# Patient Record
Sex: FEMALE | Race: BLACK OR AFRICAN AMERICAN | NOT HISPANIC OR LATINO | URBAN - METROPOLITAN AREA
[De-identification: names, ages, dates, MRNs, and addresses within clinical notes are randomized per-mention and may not be internally consistent; named-entity substitution may affect disease eponyms.]

---

## 2020-02-24 VITALS
DIASTOLIC BLOOD PRESSURE: 80 MMHG | TEMPERATURE: 98 F | SYSTOLIC BLOOD PRESSURE: 187 MMHG | RESPIRATION RATE: 16 BRPM | HEART RATE: 67 BPM | OXYGEN SATURATION: 94 %

## 2020-02-24 RX ORDER — CHLORHEXIDINE GLUCONATE 213 G/1000ML
1 SOLUTION TOPICAL ONCE
Refills: 0 | Status: DISCONTINUED | OUTPATIENT
Start: 2020-02-26 | End: 2020-02-27

## 2020-02-24 NOTE — H&P ADULT - NEUROLOGICAL DETAILS
responds to verbal commands/alert and oriented x 3/responds to pain/R UE strength 5/5; LUE strength 4/5;  RLE strength 5/5;  LLE strength on extension 5/5; LLE strength 1/5 on flexion

## 2020-02-24 NOTE — H&P ADULT - ASSESSMENT
H/H . Pt denies bleeding, GI bleeding, hematemesis, hematuria, BRBPR or melena . ASA … and Plavix … pre-cath.  Cr. IV NS@ cc/hr pre-cath.	  Risks & benefits of procedure and alternative therapy have been explained to the patient including but not limited to: allergic reaction, bleeding w/possible need for blood transfusion, infection, renal and vascular compromise, limb damage, arrhythmia, stroke, vessel dissection/perforation, Myocardial infarction, emergent CABG. Informed consent obtained and in chart 79 y/o F, former smoker, with PMH of HTN, HLD, Type II IDDM, CVA x 1 year ago (residual L arm weakness and now uses wheelchair), cataracts and PVD who recently underwent diagnostic peripheral arterial angiogram 2/19/20 due to [place] IV Chronic limb ischemic where significant stenosis was noted in R SFA, R popliteal arteries as well as L common iliac artery. Pt was referred for PTA of the L common iliac artery and b/l femoro-popiteal and infrapopliteal arteries. Pt currently admits to SOB at rest X 2-3 months; she reports that she has intermittent SOB that  occurs out of nowhere with daily minimal activities.  Pt. denies non-healing ulcers, claudication, color changes, LE edema, orthopnea/PND, N/V/D, syncope, fever, chills, BRBPR, hematuria, hematemesis. In light of patient’s risk factors and known PAD, pt is referred for Iliac catheterization with intervention of L common iliac artery.  H/H . Pt denies bleeding, GI bleeding, hematemesis, hematuria, BRBPR or melena . ASA … and Plavix … pre-cath.  Cr. IV NS@ cc/hr pre-cath.	  Risks & benefits of procedure and alternative therapy have been explained to the patient including but not limited to: allergic reaction, bleeding w/possible need for blood transfusion, infection, renal and vascular compromise, limb damage, arrhythmia, stroke, vessel dissection/perforation, Myocardial infarction, emergent CABG. Informed consent obtained and in chart 79 y/o F, former smoker, with PMH of HTN, HLD, Type II IDDM, CVA x 1 year ago (residual L arm weakness and now uses wheelchair), cataracts and PVD who recently underwent diagnostic peripheral arterial angiogram 2/19/20 due to [place] IV Chronic limb ischemic where significant stenosis was noted in R SFA, R popliteal arteries as well as L common iliac artery. Pt was referred for PTA of the L common iliac artery and b/l femoro-popiteal and infrapopliteal arteries.   Pt currently admits to SOB at rest X 2-3 months; she reports that she has intermittent SOB that  occurs out of nowhere with daily minimal activities.  EKG with TWI v4-V6; will discuss cardiac cath with Dr. Villanueva  H/H 14.4/42.6. Pt denies bleeding, GI bleeding, hematemesis, hematuria, BRBPR or melena . Pt. loaded with  mg PO X 1 and Plavix 600 mg PO X 1 pre-cath.  Cr. 0.60;  IV 1/2 NS@ 30 cc/hr pre-cath. EF unknown.   BP on arrival 212/86; no neurolgical symptoms; pt. given Hydralazine 10 IV X 1 and home TOprol 100 mg PO X 1 pre-procedure, 	  K hemolyzed; no chest pain currently; no peaked T waves noted in EKG; will send stat CMP in procedure room once access obtained  BG 350s; SSI ordered; -hemoglobin a1c is 11; consider endo consult post procedure.   Risks & benefits of procedure and alternative therapy have been explained to the patient including but not limited to: allergic reaction, bleeding w/possible need for blood transfusion, infection, renal and vascular compromise, limb damage, arrhythmia, stroke, vessel dissection/perforation, Myocardial infarction, emergent CABG. Informed consent obtained and in chart 81 y/o F, former smoker, with PMH of HTN, HLD, Type II IDDM, CVA x 1 year ago (residual L arm weakness and now uses wheelchair), cataracts and PVD who recently underwent diagnostic peripheral arterial angiogram 2/19/20 due to [place] IV Chronic limb ischemic where significant stenosis was noted in R SFA, R popliteal arteries as well as L common iliac artery. Pt was referred for PTA of the L common iliac artery and b/l femoro-popiteal and infrapopliteal arteries.   Pt currently admits to SOB at rest X 2-3 months; she reports that she has intermittent SOB that  occurs out of nowhere with daily minimal activities.  EKG with TWI v4-V6; plan for ECHO and cardiac cath as per Dr. Villanueva; pt. pre-cath consneted.   H/H 14.4/42.6. Pt denies bleeding, GI bleeding, hematemesis, hematuria, BRBPR or melena . Pt. loaded with  mg PO X 1 and Plavix 600 mg PO X 1 pre-cath.  Cr. 0.60;  IV 1/2 NS@ 30 cc/hr pre-cath. EF unknown.   BP on arrival 212/86; no neurolgical symptoms; pt. given Hydralazine 10 IV X 1 and home TOprol 100 mg PO X 1 pre-procedure, 	BP 190s 2:50 pm; pt. ordered for home hydralazine 100 mg PO X 1.   K hemolyzed; no chest pain currently; no peaked T waves noted in EKG; will send stat CMP in procedure room once access obtained  BG 350s; SSI ordered; -hemoglobin a1c is 11; consider endo consult post procedure.   Risks & benefits of procedure and alternative therapy have been explained to the patient including but not limited to: allergic reaction, bleeding w/possible need for blood transfusion, infection, renal and vascular compromise, limb damage, arrhythmia, stroke, vessel dissection/perforation, Myocardial infarction, emergent CABG. Informed consent obtained and in chart 81 y/o F, former smoker, with PMH of HTN, HLD, Type II IDDM, CVA x 1 year ago (residual L arm/leg weakness and now uses wheelchair), cataracts and PVD who recently underwent diagnostic peripheral arterial angiogram 2/19/20 due to [place] IV Chronic limb ischemic where significant stenosis was noted in R SFA, R popliteal arteries as well as L common iliac artery. Pt was referred for PTA of the L common iliac artery and b/l femoro-popiteal and infrapopliteal arteries.   Pt currently admits to SOB at rest X 2-3 months; she reports that she has intermittent SOB that  occurs out of nowhere with daily minimal activities.  EKG with TWI v4-V6; plan for ECHO and cardiac cath as per Dr. Villanueva; pt. pre-cath consneted.   H/H 14.4/42.6. Pt denies bleeding, GI bleeding, hematemesis, hematuria, BRBPR or melena . Pt. loaded with  mg PO X 1 and Plavix 600 mg PO X 1 pre-cath.  Cr. 0.60;  IV 1/2 NS@ 30 cc/hr pre-cath. EF unknown.   BP on arrival 212/86; no neurolgical symptoms; pt. given Hydralazine 10 IV X 1 and home TOprol 100 mg PO X 1 pre-procedure, 	BP 190s 2:50 pm; pt. ordered for home hydralazine 100 mg PO X 1.   K hemolyzed; no chest pain currently; no peaked T waves noted in EKG; will send stat CMP in procedure room once access obtained  BG 350s; SSI ordered; -hemoglobin a1c is 11; consider endo consult post procedure.   Risks & benefits of procedure and alternative therapy have been explained to the patient including but not limited to: allergic reaction, bleeding w/possible need for blood transfusion, infection, renal and vascular compromise, limb damage, arrhythmia, stroke, vessel dissection/perforation, Myocardial infarction, emergent CABG. Informed consent obtained and in chart 81 y/o F, former smoker, with PMH of HTN, HLD, Type II IDDM, CVA x 1 year ago (residual L arm/leg weakness and now uses wheelchair), cataracts and PVD who recently underwent diagnostic peripheral arterial angiogram 2/19/20 due to [place] IV Chronic limb ischemic where significant stenosis was noted in R SFA, R popliteal arteries as well as L common iliac artery. Pt was referred for PTA of the L common iliac artery and b/l femoro-popiteal and infrapopliteal arteries.   Pt currently admits to SOB at rest X 2-3 months; she reports that she has intermittent SOB that  occurs out of nowhere with daily minimal activities.  EKG with TWI v4-V6; denies any recent ECHO/stress test; plan for ECHO and cardiac cath as per Dr. Villanueva; pt. pre-cath consneted.   H/H 14.4/42.6. Pt denies bleeding, GI bleeding, hematemesis, hematuria, BRBPR or melena . Pt. loaded with  mg PO X 1 and Plavix 600 mg PO X 1 pre-cath.  Cr. 0.60;  IV 1/2 NS@ 30 cc/hr pre-cath. EF unknown.   BP on arrival 212/86; no neurolgical symptoms; pt. given Hydralazine 10 IV X 1 and home TOprol 100 mg PO X 1 pre-procedure, 	BP 190s 2:50 pm; pt. ordered for home hydralazine 100 mg PO X 1.   K hemolyzed; no chest pain currently; no peaked T waves noted in EKG; will send stat CMP in procedure room once access obtained  BG 350s; SSI ordered; -hemoglobin a1c is 11; consider endo consult post procedure.   Risks & benefits of procedure and alternative therapy have been explained to the patient including but not limited to: allergic reaction, bleeding w/possible need for blood transfusion, infection, renal and vascular compromise, limb damage, arrhythmia, stroke, vessel dissection/perforation, Myocardial infarction, emergent CABG. Informed consent obtained and in chart

## 2020-02-24 NOTE — H&P ADULT - NSICDXPASTMEDICALHX_GEN_ALL_CORE_FT
PAST MEDICAL HISTORY:  Cataract     Cerebrovascular accident (CVA) x1 yr ago (wheelbair bound and L arm weakness)    DM (diabetes mellitus)     HLD (hyperlipidemia)     HTN (hypertension)     PAD (peripheral artery disease)

## 2020-02-24 NOTE — H&P ADULT - HISTORY OF PRESENT ILLNESS
53 y/o F, former smoker, with PMH of HTN, HLD, Type II IDDM, CVA x 1 year ago (residual L arm weakness and now uses wheelchair), cataracts and PVD who recently underwent diagnostic peripheral arterial angiogram 2/19/20 due to [place] IV Chronic limb ischemic where significant stenosis was noted in R SFA, R popliteal arteries as well as L common iliac artery. Pt was referred for PTA of the L common iliac artery and b/l femoro-popiteal and infrapopliteal arteries. Pt currently admits to SOB at rest however denies non-healing ulcers, claudication, color changes, LE edema, orthopnea/PND, N/V/D, syncope, fever, chills, BRBPR, hematuria, hematemesis.  In light of patient’s risk factors and known PAD, pt is referred for Iliac catheterization with intervention of L common iliac artery.  Diagnostic Peripherial Catheterization 2/19/20: R common iliac minimal with atherosclerotic disease (<10% stenosis), R external iliac 10%, R common femoral 30%, R SFA 70-80% m/d, R popliteal 70-80% focal stenosis, R anterior tibial minimal atherosclerotic disease. L common iliac 80%, L external iliac 10%, L SFA 70% , L popliteal 90%, L tibial peroneal trunk 99%, L anterior tibial 99%, L peroneal minimal with <10% stenosis, L posterior tibial 100%  distal vessel fills via a robust collateral network from the peroneal. 79 y/o F, former smoker, with PMH of HTN, HLD, Type II IDDM, CVA x 1 year ago (residual L arm weakness and now uses wheelchair), cataracts and PVD who recently underwent diagnostic peripheral arterial angiogram 2/19/20 due to [place] IV Chronic limb ischemic where significant stenosis was noted in R SFA, R popliteal arteries as well as L common iliac artery. Pt was referred for PTA of the L common iliac artery and b/l femoro-popiteal and infrapopliteal arteries. Pt currently admits to SOB at rest X 2-3 months; she reports that she has intermittent SOB that  occurs out of nowhere with daily minimal activities.  Pt. denies non-healing ulcers, claudication, color changes, LE edema, orthopnea/PND, N/V/D, syncope, fever, chills, BRBPR, hematuria, hematemesis. In light of patient’s risk factors and known PAD, pt is referred for Iliac catheterization with intervention of L common iliac artery.  Diagnostic Peripherial Catheterization 2/19/20: R common iliac minimal with atherosclerotic disease (<10% stenosis), R external iliac 10%, R common femoral 30%, R SFA 70-80% m/d, R popliteal 70-80% focal stenosis, R anterior tibial minimal atherosclerotic disease. L common iliac 80%, L external iliac 10%, L SFA 70% , L popliteal 90%, L tibial peroneal trunk 99%, L anterior tibial 99%, L peroneal minimal with <10% stenosis, L posterior tibial 100%  distal vessel fills via a robust collateral network from the peroneal. 81 y/o F, former smoker, with PMH of HTN, HLD, Type II IDDM, CVA x 1 year ago (residual L arm/leg weakness and now uses wheelchair), cataracts and PVD who recently underwent diagnostic peripheral arterial angiogram 2/19/20 due to [place] IV Chronic limb ischemic where significant stenosis was noted in R SFA, R popliteal arteries as well as L common iliac artery. Pt was referred for PTA of the L common iliac artery and b/l femoro-popiteal and infrapopliteal arteries. Pt currently admits to SOB at rest X 2-3 months; she reports that she has intermittent SOB that  occurs out of nowhere with daily minimal activities.  Pt. denies non-healing ulcers, claudication, color changes, LE edema, orthopnea/PND, N/V/D, syncope, fever, chills, BRBPR, hematuria, hematemesis. In light of patient’s risk factors and known PAD, pt is referred for Iliac catheterization with intervention of L common iliac artery.  Diagnostic Peripherial Catheterization 2/19/20: R common iliac minimal with atherosclerotic disease (<10% stenosis), R external iliac 10%, R common femoral 30%, R SFA 70-80% m/d, R popliteal 70-80% focal stenosis, R anterior tibial minimal atherosclerotic disease. L common iliac 80%, L external iliac 10%, L SFA 70% , L popliteal 90%, L tibial peroneal trunk 99%, L anterior tibial 99%, L peroneal minimal with <10% stenosis, L posterior tibial 100%  distal vessel fills via a robust collateral network from the peroneal.

## 2020-02-24 NOTE — H&P ADULT - RS GEN PE MLT RESP DETAILS PC
airway patent/breath sounds equal/good air movement/normal/respirations non-labored/no intercostal retractions/no rales/no chest wall tenderness/no rhonchi/clear to auscultation bilaterally

## 2020-02-24 NOTE — H&P ADULT - NSHPLABSRESULTS_GEN_ALL_CORE
14.4   6.59  )-----------( 232      ( 26 Feb 2020 08:37 )             42.6 14.4   6.59  )-----------( 232      ( 26 Feb 2020 08:37 )             42.6    02-26    135  |  96  |  13  ----------------------------<  306<H>  See Note   |  24  |  0.60    Ca    9.1      26 Feb 2020 08:37    TPro  8.5<H>  /  Alb  4.0  /  TBili  0.4  /  DBili  x   /  AST  See Note  /  ALT  See Note  /  AlkPhos  See Note  02-26  PT/INR - ( 26 Feb 2020 08:37 )   PT: 11.2 sec;   INR: 0.98          PTT - ( 26 Feb 2020 08:37 )  PTT:29.1 sec

## 2020-02-26 ENCOUNTER — INPATIENT (INPATIENT)
Facility: HOSPITAL | Age: 80
LOS: 0 days | Discharge: HOME HEALTH | DRG: 253 | End: 2020-02-27
Attending: INTERNAL MEDICINE | Admitting: INTERNAL MEDICINE
Payer: MEDICARE

## 2020-02-26 DIAGNOSIS — Z90.710 ACQUIRED ABSENCE OF BOTH CERVIX AND UTERUS: Chronic | ICD-10-CM

## 2020-02-26 LAB
ALBUMIN SERPL ELPH-MCNC: 4 G/DL — SIGNIFICANT CHANGE UP (ref 3.3–5)
ALP SERPL-CCNC: SIGNIFICANT CHANGE UP U/L (ref 40–120)
ALT FLD-CCNC: SIGNIFICANT CHANGE UP U/L (ref 10–45)
ANION GAP SERPL CALC-SCNC: 15 MMOL/L — SIGNIFICANT CHANGE UP (ref 5–17)
APTT BLD: 29.1 SEC — SIGNIFICANT CHANGE UP (ref 27.5–36.3)
AST SERPL-CCNC: SIGNIFICANT CHANGE UP U/L (ref 10–40)
BASOPHILS # BLD AUTO: 0.02 K/UL — SIGNIFICANT CHANGE UP (ref 0–0.2)
BASOPHILS NFR BLD AUTO: 0.3 % — SIGNIFICANT CHANGE UP (ref 0–2)
BILIRUB SERPL-MCNC: 0.4 MG/DL — SIGNIFICANT CHANGE UP (ref 0.2–1.2)
BUN SERPL-MCNC: 13 MG/DL — SIGNIFICANT CHANGE UP (ref 7–23)
CALCIUM SERPL-MCNC: 9.1 MG/DL — SIGNIFICANT CHANGE UP (ref 8.4–10.5)
CHLORIDE SERPL-SCNC: 96 MMOL/L — SIGNIFICANT CHANGE UP (ref 96–108)
CHOLEST SERPL-MCNC: 173 MG/DL — SIGNIFICANT CHANGE UP (ref 10–199)
CK MB CFR SERPL CALC: 5.4 NG/ML — SIGNIFICANT CHANGE UP (ref 0–6.7)
CK SERPL-CCNC: SIGNIFICANT CHANGE UP U/L (ref 25–170)
CO2 SERPL-SCNC: 24 MMOL/L — SIGNIFICANT CHANGE UP (ref 22–31)
CREAT SERPL-MCNC: 0.6 MG/DL — SIGNIFICANT CHANGE UP (ref 0.5–1.3)
EOSINOPHIL # BLD AUTO: 0.06 K/UL — SIGNIFICANT CHANGE UP (ref 0–0.5)
EOSINOPHIL NFR BLD AUTO: 0.9 % — SIGNIFICANT CHANGE UP (ref 0–6)
GLUCOSE SERPL-MCNC: 306 MG/DL — HIGH (ref 70–99)
HBA1C BLD-MCNC: 11 % — HIGH (ref 4–5.6)
HCT VFR BLD CALC: 42.6 % — SIGNIFICANT CHANGE UP (ref 34.5–45)
HDLC SERPL-MCNC: 32 MG/DL — LOW
HGB BLD-MCNC: 14.4 G/DL — SIGNIFICANT CHANGE UP (ref 11.5–15.5)
IMM GRANULOCYTES NFR BLD AUTO: 0.5 % — SIGNIFICANT CHANGE UP (ref 0–1.5)
INR BLD: 0.98 — SIGNIFICANT CHANGE UP (ref 0.88–1.16)
LIPID PNL WITH DIRECT LDL SERPL: 86 MG/DL — SIGNIFICANT CHANGE UP
LYMPHOCYTES # BLD AUTO: 1.61 K/UL — SIGNIFICANT CHANGE UP (ref 1–3.3)
LYMPHOCYTES # BLD AUTO: 24.4 % — SIGNIFICANT CHANGE UP (ref 13–44)
MCHC RBC-ENTMCNC: 28.9 PG — SIGNIFICANT CHANGE UP (ref 27–34)
MCHC RBC-ENTMCNC: 33.8 GM/DL — SIGNIFICANT CHANGE UP (ref 32–36)
MCV RBC AUTO: 85.4 FL — SIGNIFICANT CHANGE UP (ref 80–100)
MONOCYTES # BLD AUTO: 0.45 K/UL — SIGNIFICANT CHANGE UP (ref 0–0.9)
MONOCYTES NFR BLD AUTO: 6.8 % — SIGNIFICANT CHANGE UP (ref 2–14)
NEUTROPHILS # BLD AUTO: 4.42 K/UL — SIGNIFICANT CHANGE UP (ref 1.8–7.4)
NEUTROPHILS NFR BLD AUTO: 67.1 % — SIGNIFICANT CHANGE UP (ref 43–77)
NRBC # BLD: 0 /100 WBCS — SIGNIFICANT CHANGE UP (ref 0–0)
PLATELET # BLD AUTO: 232 K/UL — SIGNIFICANT CHANGE UP (ref 150–400)
POTASSIUM SERPL-MCNC: SIGNIFICANT CHANGE UP MMOL/L (ref 3.5–5.3)
POTASSIUM SERPL-SCNC: SIGNIFICANT CHANGE UP MMOL/L (ref 3.5–5.3)
PROT SERPL-MCNC: 8.5 G/DL — HIGH (ref 6–8.3)
PROTHROM AB SERPL-ACNC: 11.2 SEC — SIGNIFICANT CHANGE UP (ref 10–12.9)
RBC # BLD: 4.99 M/UL — SIGNIFICANT CHANGE UP (ref 3.8–5.2)
RBC # FLD: 12.8 % — SIGNIFICANT CHANGE UP (ref 10.3–14.5)
SODIUM SERPL-SCNC: 135 MMOL/L — SIGNIFICANT CHANGE UP (ref 135–145)
TOTAL CHOLESTEROL/HDL RATIO MEASUREMENT: 5.4 RATIO — SIGNIFICANT CHANGE UP (ref 3.3–7.1)
TRIGL SERPL-MCNC: 275 MG/DL — HIGH (ref 10–149)
WBC # BLD: 6.59 K/UL — SIGNIFICANT CHANGE UP (ref 3.8–10.5)
WBC # FLD AUTO: 6.59 K/UL — SIGNIFICANT CHANGE UP (ref 3.8–10.5)

## 2020-02-26 PROCEDURE — 75625 CONTRAST EXAM ABDOMINL AORTA: CPT | Mod: 26

## 2020-02-26 PROCEDURE — 93306 TTE W/DOPPLER COMPLETE: CPT | Mod: 26

## 2020-02-26 PROCEDURE — 75716 ARTERY X-RAYS ARMS/LEGS: CPT | Mod: 26

## 2020-02-26 PROCEDURE — 37223: CPT | Mod: LT

## 2020-02-26 PROCEDURE — 93010 ELECTROCARDIOGRAM REPORT: CPT

## 2020-02-26 RX ORDER — HYDRALAZINE HCL 50 MG
100 TABLET ORAL ONCE
Refills: 0 | Status: COMPLETED | OUTPATIENT
Start: 2020-02-26 | End: 2020-02-26

## 2020-02-26 RX ORDER — AMLODIPINE BESYLATE 2.5 MG/1
10 TABLET ORAL ONCE
Refills: 0 | Status: COMPLETED | OUTPATIENT
Start: 2020-02-26 | End: 2020-02-26

## 2020-02-26 RX ORDER — HYDRALAZINE HCL 50 MG
100 TABLET ORAL DAILY
Refills: 0 | Status: DISCONTINUED | OUTPATIENT
Start: 2020-02-26 | End: 2020-02-27

## 2020-02-26 RX ORDER — MORPHINE SULFATE 50 MG/1
2 CAPSULE, EXTENDED RELEASE ORAL ONCE
Refills: 0 | Status: DISCONTINUED | OUTPATIENT
Start: 2020-02-26 | End: 2020-02-26

## 2020-02-26 RX ORDER — SODIUM CHLORIDE 9 MG/ML
500 INJECTION INTRAMUSCULAR; INTRAVENOUS; SUBCUTANEOUS
Refills: 0 | Status: DISCONTINUED | OUTPATIENT
Start: 2020-02-26 | End: 2020-02-27

## 2020-02-26 RX ORDER — DEXTROSE 50 % IN WATER 50 %
25 SYRINGE (ML) INTRAVENOUS ONCE
Refills: 0 | Status: DISCONTINUED | OUTPATIENT
Start: 2020-02-26 | End: 2020-02-27

## 2020-02-26 RX ORDER — ASPIRIN/CALCIUM CARB/MAGNESIUM 324 MG
81 TABLET ORAL DAILY
Refills: 0 | Status: DISCONTINUED | OUTPATIENT
Start: 2020-02-26 | End: 2020-02-27

## 2020-02-26 RX ORDER — INSULIN LISPRO 100/ML
VIAL (ML) SUBCUTANEOUS ONCE
Refills: 0 | Status: COMPLETED | OUTPATIENT
Start: 2020-02-26 | End: 2020-02-26

## 2020-02-26 RX ORDER — FUROSEMIDE 40 MG
1 TABLET ORAL
Qty: 0 | Refills: 0 | DISCHARGE

## 2020-02-26 RX ORDER — SODIUM CHLORIDE 9 MG/ML
500 INJECTION, SOLUTION INTRAVENOUS
Refills: 0 | Status: DISCONTINUED | OUTPATIENT
Start: 2020-02-26 | End: 2020-02-26

## 2020-02-26 RX ORDER — INSULIN LISPRO 100/ML
VIAL (ML) SUBCUTANEOUS
Refills: 0 | Status: DISCONTINUED | OUTPATIENT
Start: 2020-02-26 | End: 2020-02-27

## 2020-02-26 RX ORDER — ASPIRIN/CALCIUM CARB/MAGNESIUM 324 MG
325 TABLET ORAL ONCE
Refills: 0 | Status: COMPLETED | OUTPATIENT
Start: 2020-02-26 | End: 2020-02-26

## 2020-02-26 RX ORDER — LISINOPRIL 2.5 MG/1
1 TABLET ORAL
Qty: 0 | Refills: 0 | DISCHARGE

## 2020-02-26 RX ORDER — FERROUS SULFATE 325(65) MG
325 TABLET ORAL DAILY
Refills: 0 | Status: DISCONTINUED | OUTPATIENT
Start: 2020-02-26 | End: 2020-02-27

## 2020-02-26 RX ORDER — HYDRALAZINE HCL 50 MG
10 TABLET ORAL ONCE
Refills: 0 | Status: COMPLETED | OUTPATIENT
Start: 2020-02-26 | End: 2020-02-26

## 2020-02-26 RX ORDER — PROTAMINE SULFATE 10 MG/ML
10 AMPUL (ML) INTRAVENOUS ONCE
Refills: 0 | Status: COMPLETED | OUTPATIENT
Start: 2020-02-26 | End: 2020-02-26

## 2020-02-26 RX ORDER — AMLODIPINE BESYLATE 2.5 MG/1
10 TABLET ORAL DAILY
Refills: 0 | Status: DISCONTINUED | OUTPATIENT
Start: 2020-02-26 | End: 2020-02-27

## 2020-02-26 RX ORDER — LISINOPRIL 2.5 MG/1
40 TABLET ORAL DAILY
Refills: 0 | Status: DISCONTINUED | OUTPATIENT
Start: 2020-02-27 | End: 2020-02-27

## 2020-02-26 RX ORDER — GLUCAGON INJECTION, SOLUTION 0.5 MG/.1ML
1 INJECTION, SOLUTION SUBCUTANEOUS ONCE
Refills: 0 | Status: DISCONTINUED | OUTPATIENT
Start: 2020-02-26 | End: 2020-02-27

## 2020-02-26 RX ORDER — FERROUS SULFATE 325(65) MG
0 TABLET ORAL
Qty: 0 | Refills: 0 | DISCHARGE

## 2020-02-26 RX ORDER — SODIUM CHLORIDE 9 MG/ML
1000 INJECTION, SOLUTION INTRAVENOUS
Refills: 0 | Status: DISCONTINUED | OUTPATIENT
Start: 2020-02-26 | End: 2020-02-27

## 2020-02-26 RX ORDER — GABAPENTIN 400 MG/1
400 CAPSULE ORAL THREE TIMES A DAY
Refills: 0 | Status: DISCONTINUED | OUTPATIENT
Start: 2020-02-26 | End: 2020-02-27

## 2020-02-26 RX ORDER — HYDRALAZINE HCL 50 MG
0 TABLET ORAL
Qty: 0 | Refills: 0 | DISCHARGE

## 2020-02-26 RX ORDER — DEXTROSE 50 % IN WATER 50 %
12.5 SYRINGE (ML) INTRAVENOUS ONCE
Refills: 0 | Status: DISCONTINUED | OUTPATIENT
Start: 2020-02-26 | End: 2020-02-27

## 2020-02-26 RX ORDER — DEXTROSE 50 % IN WATER 50 %
15 SYRINGE (ML) INTRAVENOUS ONCE
Refills: 0 | Status: DISCONTINUED | OUTPATIENT
Start: 2020-02-26 | End: 2020-02-27

## 2020-02-26 RX ORDER — METOPROLOL TARTRATE 50 MG
100 TABLET ORAL ONCE
Refills: 0 | Status: COMPLETED | OUTPATIENT
Start: 2020-02-26 | End: 2020-02-26

## 2020-02-26 RX ORDER — CLOPIDOGREL BISULFATE 75 MG/1
75 TABLET, FILM COATED ORAL DAILY
Refills: 0 | Status: DISCONTINUED | OUTPATIENT
Start: 2020-02-27 | End: 2020-02-27

## 2020-02-26 RX ORDER — SITAGLIPTIN 50 MG/1
1 TABLET, FILM COATED ORAL
Qty: 0 | Refills: 0 | DISCHARGE

## 2020-02-26 RX ORDER — CLOPIDOGREL BISULFATE 75 MG/1
600 TABLET, FILM COATED ORAL ONCE
Refills: 0 | Status: COMPLETED | OUTPATIENT
Start: 2020-02-26 | End: 2020-02-26

## 2020-02-26 RX ORDER — ATORVASTATIN CALCIUM 80 MG/1
40 TABLET, FILM COATED ORAL DAILY
Refills: 0 | Status: DISCONTINUED | OUTPATIENT
Start: 2020-02-26 | End: 2020-02-27

## 2020-02-26 RX ORDER — GABAPENTIN 400 MG/1
1 CAPSULE ORAL
Qty: 0 | Refills: 0 | DISCHARGE

## 2020-02-26 RX ORDER — FUROSEMIDE 40 MG
40 TABLET ORAL DAILY
Refills: 0 | Status: DISCONTINUED | OUTPATIENT
Start: 2020-02-27 | End: 2020-02-27

## 2020-02-26 RX ORDER — ATORVASTATIN CALCIUM 80 MG/1
1 TABLET, FILM COATED ORAL
Qty: 0 | Refills: 0 | DISCHARGE

## 2020-02-26 RX ORDER — INSULIN GLARGINE 100 [IU]/ML
40 INJECTION, SOLUTION SUBCUTANEOUS
Qty: 0 | Refills: 0 | DISCHARGE

## 2020-02-26 RX ORDER — AMLODIPINE BESYLATE 2.5 MG/1
1 TABLET ORAL
Qty: 0 | Refills: 0 | DISCHARGE

## 2020-02-26 RX ADMIN — Medication 10 MILLIGRAM(S): at 20:30

## 2020-02-26 RX ADMIN — SODIUM CHLORIDE 30 MILLILITER(S): 9 INJECTION, SOLUTION INTRAVENOUS at 10:08

## 2020-02-26 RX ADMIN — CLOPIDOGREL BISULFATE 600 MILLIGRAM(S): 75 TABLET, FILM COATED ORAL at 10:11

## 2020-02-26 RX ADMIN — Medication 100 MILLIGRAM(S): at 15:35

## 2020-02-26 RX ADMIN — Medication 8: at 09:43

## 2020-02-26 RX ADMIN — Medication 100 MILLIGRAM(S): at 10:11

## 2020-02-26 RX ADMIN — MORPHINE SULFATE 2 MILLIGRAM(S): 50 CAPSULE, EXTENDED RELEASE ORAL at 23:00

## 2020-02-26 RX ADMIN — MORPHINE SULFATE 2 MILLIGRAM(S): 50 CAPSULE, EXTENDED RELEASE ORAL at 23:32

## 2020-02-26 RX ADMIN — AMLODIPINE BESYLATE 10 MILLIGRAM(S): 2.5 TABLET ORAL at 19:35

## 2020-02-26 RX ADMIN — SODIUM CHLORIDE 75 MILLILITER(S): 9 INJECTION INTRAMUSCULAR; INTRAVENOUS; SUBCUTANEOUS at 22:39

## 2020-02-26 RX ADMIN — Medication 10 MILLIGRAM(S): at 09:59

## 2020-02-26 RX ADMIN — ATORVASTATIN CALCIUM 40 MILLIGRAM(S): 80 TABLET, FILM COATED ORAL at 23:28

## 2020-02-26 RX ADMIN — Medication 10 MILLIGRAM(S): at 19:34

## 2020-02-26 RX ADMIN — Medication 325 MILLIGRAM(S): at 10:11

## 2020-02-26 RX ADMIN — GABAPENTIN 400 MILLIGRAM(S): 400 CAPSULE ORAL at 23:29

## 2020-02-26 NOTE — CHART NOTE - NSCHARTNOTEFT_GEN_A_CORE
PA called to bedside by RN for left femoral ooze. Site compressed, and dressing changed but with continued ooze although improved after compression. Josh MATSON notified, would like Protamine IV 10mg x1. Protamine ordered and RN made aware to administer. Per Josh will come to pull bilateral sheaths at 8:30pm. Will continue to monitor site

## 2020-02-27 VITALS — TEMPERATURE: 98 F

## 2020-02-27 LAB
ANION GAP SERPL CALC-SCNC: 12 MMOL/L — SIGNIFICANT CHANGE UP (ref 5–17)
BUN SERPL-MCNC: 10 MG/DL — SIGNIFICANT CHANGE UP (ref 7–23)
CALCIUM SERPL-MCNC: 8.6 MG/DL — SIGNIFICANT CHANGE UP (ref 8.4–10.5)
CHLORIDE SERPL-SCNC: 100 MMOL/L — SIGNIFICANT CHANGE UP (ref 96–108)
CO2 SERPL-SCNC: 24 MMOL/L — SIGNIFICANT CHANGE UP (ref 22–31)
CREAT SERPL-MCNC: 0.67 MG/DL — SIGNIFICANT CHANGE UP (ref 0.5–1.3)
GLUCOSE BLDC GLUCOMTR-MCNC: 338 MG/DL — HIGH (ref 70–99)
GLUCOSE SERPL-MCNC: 236 MG/DL — HIGH (ref 70–99)
HCT VFR BLD CALC: 38.5 % — SIGNIFICANT CHANGE UP (ref 34.5–45)
HGB BLD-MCNC: 12.8 G/DL — SIGNIFICANT CHANGE UP (ref 11.5–15.5)
MAGNESIUM SERPL-MCNC: 1.5 MG/DL — LOW (ref 1.6–2.6)
MCHC RBC-ENTMCNC: 28.4 PG — SIGNIFICANT CHANGE UP (ref 27–34)
MCHC RBC-ENTMCNC: 33.2 GM/DL — SIGNIFICANT CHANGE UP (ref 32–36)
MCV RBC AUTO: 85.4 FL — SIGNIFICANT CHANGE UP (ref 80–100)
NRBC # BLD: 0 /100 WBCS — SIGNIFICANT CHANGE UP (ref 0–0)
PLATELET # BLD AUTO: 217 K/UL — SIGNIFICANT CHANGE UP (ref 150–400)
POTASSIUM SERPL-MCNC: 3.4 MMOL/L — LOW (ref 3.5–5.3)
POTASSIUM SERPL-SCNC: 3.4 MMOL/L — LOW (ref 3.5–5.3)
RBC # BLD: 4.51 M/UL — SIGNIFICANT CHANGE UP (ref 3.8–5.2)
RBC # FLD: 12.9 % — SIGNIFICANT CHANGE UP (ref 10.3–14.5)
SODIUM SERPL-SCNC: 136 MMOL/L — SIGNIFICANT CHANGE UP (ref 135–145)
WBC # BLD: 8.32 K/UL — SIGNIFICANT CHANGE UP (ref 3.8–10.5)
WBC # FLD AUTO: 8.32 K/UL — SIGNIFICANT CHANGE UP (ref 3.8–10.5)

## 2020-02-27 PROCEDURE — 93010 ELECTROCARDIOGRAM REPORT: CPT

## 2020-02-27 PROCEDURE — 82550 ASSAY OF CK (CPK): CPT

## 2020-02-27 PROCEDURE — 80048 BASIC METABOLIC PNL TOTAL CA: CPT

## 2020-02-27 PROCEDURE — C1887: CPT

## 2020-02-27 PROCEDURE — 36415 COLL VENOUS BLD VENIPUNCTURE: CPT

## 2020-02-27 PROCEDURE — C1769: CPT

## 2020-02-27 PROCEDURE — 82962 GLUCOSE BLOOD TEST: CPT

## 2020-02-27 PROCEDURE — 85730 THROMBOPLASTIN TIME PARTIAL: CPT

## 2020-02-27 PROCEDURE — 85027 COMPLETE CBC AUTOMATED: CPT

## 2020-02-27 PROCEDURE — C1725: CPT

## 2020-02-27 PROCEDURE — 83036 HEMOGLOBIN GLYCOSYLATED A1C: CPT

## 2020-02-27 PROCEDURE — 85610 PROTHROMBIN TIME: CPT

## 2020-02-27 PROCEDURE — 80061 LIPID PANEL: CPT

## 2020-02-27 PROCEDURE — 93005 ELECTROCARDIOGRAM TRACING: CPT

## 2020-02-27 PROCEDURE — C1894: CPT

## 2020-02-27 PROCEDURE — C1760: CPT

## 2020-02-27 PROCEDURE — 82553 CREATINE MB FRACTION: CPT

## 2020-02-27 PROCEDURE — 85025 COMPLETE CBC W/AUTO DIFF WBC: CPT

## 2020-02-27 PROCEDURE — 99239 HOSP IP/OBS DSCHRG MGMT >30: CPT

## 2020-02-27 PROCEDURE — 80053 COMPREHEN METABOLIC PANEL: CPT

## 2020-02-27 PROCEDURE — 83735 ASSAY OF MAGNESIUM: CPT

## 2020-02-27 RX ORDER — CLOPIDOGREL BISULFATE 75 MG/1
1 TABLET, FILM COATED ORAL
Qty: 30 | Refills: 11
Start: 2020-02-27 | End: 2021-02-20

## 2020-02-27 RX ORDER — POTASSIUM CHLORIDE 20 MEQ
40 PACKET (EA) ORAL ONCE
Refills: 0 | Status: COMPLETED | OUTPATIENT
Start: 2020-02-27 | End: 2020-02-27

## 2020-02-27 RX ORDER — POTASSIUM CHLORIDE 20 MEQ
20 PACKET (EA) ORAL ONCE
Refills: 0 | Status: COMPLETED | OUTPATIENT
Start: 2020-02-27 | End: 2020-02-27

## 2020-02-27 RX ORDER — HYDRALAZINE HCL 50 MG
100 TABLET ORAL THREE TIMES A DAY
Refills: 0 | Status: DISCONTINUED | OUTPATIENT
Start: 2020-02-27 | End: 2020-02-27

## 2020-02-27 RX ORDER — ASPIRIN/CALCIUM CARB/MAGNESIUM 324 MG
1 TABLET ORAL
Qty: 30 | Refills: 11
Start: 2020-02-27 | End: 2021-02-20

## 2020-02-27 RX ORDER — MAGNESIUM OXIDE 400 MG ORAL TABLET 241.3 MG
400 TABLET ORAL ONCE
Refills: 0 | Status: COMPLETED | OUTPATIENT
Start: 2020-02-27 | End: 2020-02-27

## 2020-02-27 RX ORDER — ASPIRIN/CALCIUM CARB/MAGNESIUM 324 MG
1 TABLET ORAL
Qty: 0 | Refills: 0 | DISCHARGE

## 2020-02-27 RX ORDER — LISINOPRIL 2.5 MG/1
40 TABLET ORAL ONCE
Refills: 0 | Status: COMPLETED | OUTPATIENT
Start: 2020-02-27 | End: 2020-02-27

## 2020-02-27 RX ADMIN — Medication 81 MILLIGRAM(S): at 12:31

## 2020-02-27 RX ADMIN — Medication 100 MILLIGRAM(S): at 11:01

## 2020-02-27 RX ADMIN — Medication 8: at 13:48

## 2020-02-27 RX ADMIN — Medication 100 MILLIGRAM(S): at 05:34

## 2020-02-27 RX ADMIN — Medication 20 MILLIEQUIVALENT(S): at 11:01

## 2020-02-27 RX ADMIN — Medication 4: at 07:31

## 2020-02-27 RX ADMIN — AMLODIPINE BESYLATE 10 MILLIGRAM(S): 2.5 TABLET ORAL at 05:34

## 2020-02-27 RX ADMIN — Medication 325 MILLIGRAM(S): at 12:30

## 2020-02-27 RX ADMIN — Medication 40 MILLIEQUIVALENT(S): at 07:31

## 2020-02-27 RX ADMIN — MAGNESIUM OXIDE 400 MG ORAL TABLET 400 MILLIGRAM(S): 241.3 TABLET ORAL at 07:30

## 2020-02-27 RX ADMIN — MAGNESIUM OXIDE 400 MG ORAL TABLET 400 MILLIGRAM(S): 241.3 TABLET ORAL at 11:01

## 2020-02-27 RX ADMIN — GABAPENTIN 400 MILLIGRAM(S): 400 CAPSULE ORAL at 05:34

## 2020-02-27 RX ADMIN — LISINOPRIL 40 MILLIGRAM(S): 2.5 TABLET ORAL at 09:14

## 2020-02-27 RX ADMIN — CLOPIDOGREL BISULFATE 75 MILLIGRAM(S): 75 TABLET, FILM COATED ORAL at 12:30

## 2020-02-27 NOTE — PROVIDER CONTACT NOTE (OTHER) - ACTION/TREATMENT ORDERED:
Spoke with DANO Sequeira. Stated to administer insulin and monitor pt. Will monitor pt closely. Spoke with DANO Sequeira. Stated to administer insulin and monitor pt. Pt scheduled for 2pm discharge . PA aware and ok with d/c. Will monitor pt closely.

## 2020-02-27 NOTE — DISCHARGE NOTE PROVIDER - INSTRUCTIONS
A Mediterranean Diet is recommended! Some suggestions include continue incorporating 2 or more servings per day of vegetables, fruits, and whole grains. Increase intake of fish and legumes/beans to 2 or more servings per week. Aim to increase intake of healthy fats, such as olive oil and avocados, and have a handful of nuts/seeds most days. Reduce red/processed meat consumption to 2 or fewer times per week

## 2020-02-27 NOTE — DISCHARGE NOTE PROVIDER - NSDCMRMEDTOKEN_GEN_ALL_CORE_FT
amLODIPine 10 mg oral tablet: 1 tab(s) orally once a day  aspirin 81 mg oral delayed release tablet: 1 tab(s) orally once a day  ferrous sulfate 324 mg (65 mg elemental iron) oral tablet: orally 2 times a day  gabapentin 400 mg oral capsule: 1 cap(s) orally 3 times a day  hydrALAZINE 100 mg oral tablet: orally 3 times a day  Januvia 100 mg oral tablet: 1 tab(s) orally once a day  Lantus 100 units/mL subcutaneous solution: 40 unit(s) subcutaneous once a day (at bedtime)  Lasix 40 mg oral tablet: 1 tab(s) orally once a day  Lipitor 40 mg oral tablet: 1 tab(s) orally once a day  lisinopril 40 mg oral tablet: 1 tab(s) orally once a day  multivitamin: orally once a day  Plavix 75 mg oral tablet: 1 tab(s) orally once a day

## 2020-02-27 NOTE — DISCHARGE NOTE NURSING/CASE MANAGEMENT/SOCIAL WORK - PATIENT PORTAL LINK FT
You can access the FollowMyHealth Patient Portal offered by [facility] by registering at the following website: [email or web address]. By joining [email or web address] FollowMyHealth portal, you will also be able to view your health information using other applications (apps) compatible with our system.

## 2020-02-27 NOTE — DISCHARGE NOTE NURSING/CASE MANAGEMENT/SOCIAL WORK - NSDCPEWEB_GEN_ALL_CORE
[facility] for [facility] website --- [email or web address] --- [email or web address]

## 2020-02-27 NOTE — DISCHARGE NOTE PROVIDER - CARE PROVIDER_API CALL
Joaquin Villanueva)  Cardiology; Internal Medicine; Interventional Cardiology  [address] [place] [address]  Phone: [phone]  Fax: [phone]  Follow Up Time: 1 week

## 2020-02-27 NOTE — DISCHARGE NOTE PROVIDER - HOSPITAL COURSE
79 yo F, former smoker, with PMH of HTN, HLD, Type II IDDM, CVA x 1 year ago (residual L arm/leg weakness and now uses wheelchair), cataracts and PVD who recently underwent diagnostic peripheral arterial angiogram 2/19/20 due to [place] IV Chronic limb ischemic where significant stenosis was noted in R SFA, R popliteal arteries as well as L common iliac artery. Pt was referred for PTA of the L common iliac artery and b/l femoro-popiteal and infrapopliteal arteries. Pt currently admits to SOB at rest X 2-3 months; she reports that she has intermittent SOB that  occurs out of nowhere with daily minimal activities.  Patient was referred for peripheral catheterization on 2/27/20 revealing PTA/VBX stent to Left prox common iliac procedure done via Right and Left femoral artery hemostasis was achieved with manual compression. Pt. seen and examined at bedside this morning, without complaints and is out of bed ambulating. Right and Left groin sights stable without bleeding or hematoma, distal pulses intact. Vital signs stable, labs and telemetry reviewed. Pts A1c was elevated to 11.0. Home medication regime reviewed with Dr. Trujillo and patient is to continue taking Lipitor 40 mg, Januvia 100 mg daily, ferrous sulfate 324 mg BID, Lasix 40 mg daily, multivitamin, gabapentin 400 three times daily amLODIPine 10 mg daily, hydrALAZINE 100 mg 3 times a day, lisinopril 40 mg daily, aspirin 81 mg daily, Lantus 100 units/mL subcutaneous solution 40 units at bedtime. Patient was started on Plvix 75mg daily. Pt. stable for discharge as per  …and to f/u with  … in 1-2 week Patient has been given appropriate discharge instructions including medication regimen, access site management and follow up. Prescriptions have been e-prescribed to patient's preferred pharmacy. 81 yo F, former smoker, with PMH of HTN, HLD, Type II IDDM, CVA x 1 year ago (residual L arm/leg weakness and now uses wheelchair), cataracts and PVD who recently underwent diagnostic peripheral arterial angiogram 2/19/20 due to [place] IV Chronic limb ischemic where significant stenosis was noted in R SFA, R popliteal arteries as well as L common iliac artery. Pt was referred for PTA of the L common iliac artery and b/l femoro-popiteal and infrapopliteal arteries. Pt currently admits to SOB at rest X 2-3 months; she reports that she has intermittent SOB that  occurs out of nowhere with daily minimal activities.  Patient was referred for peripheral catheterization on 2/27/20 revealing PTA/VBX stent to Left prox common iliac procedure done via Right and Left femoral artery hemostasis was achieved with manual compression. Pt. seen and examined at bedside this morning, without complaints and is out of bed ambulating. Right and Left groin sights stable without bleeding or hematoma, distal pulses intact. Vital signs stable, labs and telemetry reviewed. Pts A1c was elevated to 11.0. Home medication regime reviewed with Dr. Trujillo and patient is to continue taking Lipitor 40 mg, Januvia 100 mg daily, ferrous sulfate 324 mg BID, Lasix 40 mg daily, multivitamin, gabapentin 400 three times daily amLODIPine 10 mg daily, hydrALAZINE 100 mg 3 times a day, lisinopril 40 mg daily, aspirin 81 mg daily, Lantus 100 units/mL subcutaneous solution 40 units at bedtime. Patient was started on Plvix 75mg daily. Pt. stable for discharge as per Dr. Trujillo and to f/u with Dr. Villanueva  in 1-2 week for evaluation and Left heart cath Patient has been given appropriate discharge instructions including medication regimen, access site management and follow up. Prescriptions have been e-prescribed to patient's preferred pharmacy.

## 2020-02-27 NOTE — DISCHARGE NOTE PROVIDER - NSDCCPCAREPLAN_GEN_ALL_CORE_FT
PRINCIPAL DISCHARGE DIAGNOSIS  Diagnosis: PAD (peripheral artery disease)  Assessment and Plan of Treatment: -You underwent a peripheral catheterization 2/26/20 and the blockage in your left proximal iliac was opened with stent placement. NEVER MISS A DOSE OF ASPIRIN OR PLAVIX; IF YOU DO, YOU ARE AT RISK OF YOUR STENT CLOSING. DO NOT STOP THESE TWO MEDICATIONS UNLESS INSTRUCTED TO DO SO BY YOUR CARDIOLOGIST. Your procedure was done through your groin . You do not need to keep this area covered and you may shower. Please avoid any heavy lifting  (no more than 3 to 5 lbs) or strenuous activity for five days. If you develop any swelling, bleeding, hardening of the skin (hematoma formation), acute pain, numbness/tingling  in your leg please contact your doctor immediately or call our 24/7 line: [phone] Please return to the hospital/seek immediate medical attention if worsening of symptoms- including not limited to Leg pain, pallor or coldness.  Please follow up with Dr. Villanueva in 1-2 weeks, she recommends you have a cardiac catheterization in 3 weeks to evaluate your shortness of breath. Please call his office and make an appointment to see him. Please continue a heart healthy diet low in sodium, cholesterol, and fat.      SECONDARY DISCHARGE DIAGNOSES  Diagnosis: Type 2 diabetes mellitus  Assessment and Plan of Treatment:   Your Hemoglobin A1c is 11. Your goal Hemoglobin A1c is less than 7.0%, This number measures your average blood sugar level over the last three months. Please continue taking , Januvia 100 mg, Lantus 100 units/mL subcutaneous solution 40 units at bedtime daily,Ways to lower this number include: diet, exercise, monitoring your fingersticks, adhering to your medication regimen and regular follow up during you Endocrinologist/Primary Care Doctor.    Diagnosis: HLD (hyperlipidemia)  Assessment and Plan of Treatment: Your cholesterol panel is abnormal. Your LDL is 86 mg/dL and your goal LDL is less than 70 mg/dL. LDL is also known as "bad cholesterol" because it takes cholesterol to your arteries, where it may collect in artery walls. Too much cholesterol in your arteries may lead to a buildup of plaque known as atherosclerosis and can cause heart disease.  You can lower your LDL with medications and lifestyle modifications such as weight loss in overweight patients, aerobic exercise, and eating diets lower in saturated fats. Please continue taking Atorvastatin 40mg daily  -Your HDL is 32 mg/dL and your goal HDL is greater than 50 mg/dL. The higher the HDL the better; HDL is known as “good cholesterol” because it transports cholesterol to your liver to be expelled from your body.      Diagnosis: Hypertension  Assessment and Plan of Treatment: Hypertension, commonly called high blood pressure, is when the force of blood pumping through your arteries is too strong. Hypertension forces your heart to work harder to pump blood. Your arteries may become narrow or stiff. Having untreated or uncontrolled hypertension for a long period of time can cause heart attack, stroke, kidney disease, and other problems. Please continue Lasix 40 mg daily, amLODIPine 10 mg daily, hydrALAZINE 100 mg 3 times a day, lisinopril 40 mg daily Maintain a healthy lifestyle and follow up with your primary care physician. For blood pressures at home that are too high or low please see your doctor or go to the Emergency Room as necessary.

## 2020-02-27 NOTE — DISCHARGE NOTE NURSING/CASE MANAGEMENT/SOCIAL WORK - NSDCPEPTSTRK_GEN_ALL_CORE
Need for follow up after discharge/Prescribed medications/Stroke warning signs and symptoms/Risk factors for stroke/Stroke education booklet/Signs and symptoms of stroke/Call 911 for stroke/Stroke support groups for patients, families, and friends

## 2020-02-27 NOTE — DISCHARGE NOTE NURSING/CASE MANAGEMENT/SOCIAL WORK - NSDCPEEMAIL_GEN_ALL_CORE
[facility] for Tobacco Control email [email or web address]

## 2020-02-28 RX ORDER — ASPIRIN/CALCIUM CARB/MAGNESIUM 324 MG
1 TABLET ORAL
Qty: 30 | Refills: 11
Start: 2020-02-28 | End: 2021-02-21

## 2020-02-28 RX ORDER — CLOPIDOGREL BISULFATE 75 MG/1
1 TABLET, FILM COATED ORAL
Qty: 30 | Refills: 11
Start: 2020-02-28 | End: 2021-02-21

## 2020-03-05 DIAGNOSIS — Z99.3 DEPENDENCE ON WHEELCHAIR: ICD-10-CM

## 2020-03-05 DIAGNOSIS — E78.5 HYPERLIPIDEMIA, UNSPECIFIED: ICD-10-CM

## 2020-03-05 DIAGNOSIS — I10 ESSENTIAL (PRIMARY) HYPERTENSION: ICD-10-CM

## 2020-03-05 DIAGNOSIS — I69.354 HEMIPLEGIA AND HEMIPARESIS FOLLOWING CEREBRAL INFARCTION AFFECTING LEFT NON-DOMINANT SIDE: ICD-10-CM

## 2020-03-05 DIAGNOSIS — E11.9 TYPE 2 DIABETES MELLITUS WITHOUT COMPLICATIONS: ICD-10-CM

## 2020-03-05 DIAGNOSIS — Z87.891 PERSONAL HISTORY OF NICOTINE DEPENDENCE: ICD-10-CM

## 2020-03-05 DIAGNOSIS — I70.292 OTHER ATHEROSCLEROSIS OF NATIVE ARTERIES OF EXTREMITIES, LEFT LEG: ICD-10-CM

## 2020-03-05 DIAGNOSIS — I73.9 PERIPHERAL VASCULAR DISEASE, UNSPECIFIED: ICD-10-CM

## 2024-01-23 NOTE — PATIENT PROFILE ADULT - NSPROPTRIGHTCAREGIVER_GEN_A_NUR
Quality 226: Preventive Care And Screening: Tobacco Use: Screening And Cessation Intervention: Patient screened for tobacco use and is an ex/non-smoker Quality 130: Documentation Of Current Medications In The Medical Record: Current Medications Documented Detail Level: Detailed Quality 431: Preventive Care And Screening: Unhealthy Alcohol Use - Screening: Patient not identified as an unhealthy alcohol user when screened for unhealthy alcohol use using a systematic screening method declines